# Patient Record
Sex: FEMALE | Race: OTHER | HISPANIC OR LATINO | Employment: UNEMPLOYED | URBAN - METROPOLITAN AREA
[De-identification: names, ages, dates, MRNs, and addresses within clinical notes are randomized per-mention and may not be internally consistent; named-entity substitution may affect disease eponyms.]

---
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
## 2024-01-30 ENCOUNTER — HOSPITAL ENCOUNTER (EMERGENCY)
Facility: HOSPITAL | Age: 19
Discharge: HOME | End: 2024-01-30
Attending: EMERGENCY MEDICINE
Payer: MEDICAID

## 2024-01-30 VITALS
HEART RATE: 94 BPM | DIASTOLIC BLOOD PRESSURE: 61 MMHG | HEIGHT: 59 IN | SYSTOLIC BLOOD PRESSURE: 96 MMHG | WEIGHT: 178.38 LBS | BODY MASS INDEX: 35.96 KG/M2 | TEMPERATURE: 99 F | RESPIRATION RATE: 16 BRPM | OXYGEN SATURATION: 99 %

## 2024-01-30 DIAGNOSIS — Z34.93 THIRD TRIMESTER PREGNANCY: Primary | ICD-10-CM

## 2024-01-30 PROCEDURE — 99282 EMERGENCY DEPT VISIT SF MDM: CPT

## 2024-01-30 NOTE — DISCHARGE INSTRUCTIONS
Report to Emergency Department if symptoms return or worsen; [facility] Within 1 to 2 days, It is important that you follow up with your primary care provider or specialist if indicated for further evaluation, workup, and treatment as necessary. The exam and treatment you received in Emergency Department was for an urgent problem and NOT INTENDED AS COMPLETE CARE. It is important that you FOLLOW UP with a doctor for ongoing care. If your symptoms become WORSE or you DO NOT IMPROVE and you are unable to reach your health care provider, you should RETURN to the Emergency Department. The Emergency Department provider has provided a PRELIMINARY INTERPRETATION of all your studies. A final interpretation may be done after you are discharged. If a change in your diagnosis or treatment is needed WE WILL CONTACT YOU. It is critical that we have a CURRENT PHONE NUMBER FOR YOU.

## 2024-02-02 ENCOUNTER — OFFICE VISIT (OUTPATIENT)
Dept: FAMILY MEDICINE | Facility: CLINIC | Age: 19
End: 2024-02-02
Payer: MEDICAID

## 2024-02-02 VITALS
HEART RATE: 58 BPM | SYSTOLIC BLOOD PRESSURE: 98 MMHG | HEIGHT: 59 IN | TEMPERATURE: 98 F | DIASTOLIC BLOOD PRESSURE: 57 MMHG | OXYGEN SATURATION: 99 % | RESPIRATION RATE: 20 BRPM | BODY MASS INDEX: 36.08 KG/M2 | WEIGHT: 179 LBS

## 2024-02-02 DIAGNOSIS — N34.2 INFECTIVE URETHRITIS: ICD-10-CM

## 2024-02-02 DIAGNOSIS — R30.0 DYSURIA: ICD-10-CM

## 2024-02-02 DIAGNOSIS — Z3A.36 36 WEEKS GESTATION OF PREGNANCY: Primary | ICD-10-CM

## 2024-02-02 LAB
APPEARANCE UR: ABNORMAL
BACTERIA #/AREA URNS AUTO: ABNORMAL /HPF
BILIRUB SERPL-MCNC: NORMAL MG/DL
BILIRUB UR QL STRIP.AUTO: NEGATIVE
BLOOD URINE, POC: NORMAL
C TRACH DNA SPEC QL NAA+PROBE: NOT DETECTED
CLARITY, POC UA: NORMAL
COLOR UR AUTO: ABNORMAL
COLOR, POC UA: YELLOW
GLUCOSE UR QL STRIP.AUTO: NORMAL
GLUCOSE UR QL STRIP: NORMAL
HYALINE CASTS #/AREA URNS LPF: ABNORMAL /LPF
KETONES UR QL STRIP.AUTO: NEGATIVE
KETONES UR QL STRIP: NORMAL
LEUKOCYTE ESTERASE UR QL STRIP.AUTO: 250
LEUKOCYTE ESTERASE URINE, POC: NORMAL
MUCOUS THREADS URNS QL MICRO: ABNORMAL /LPF
N GONORRHOEA DNA SPEC QL NAA+PROBE: NOT DETECTED
NITRITE UR QL STRIP.AUTO: NEGATIVE
NITRITE, POC UA: NORMAL
PH UR STRIP.AUTO: 7 [PH]
PH, POC UA: 7
PRENATAL STREP B CULTURE: NEGATIVE
PROT UR QL STRIP.AUTO: NEGATIVE
PROTEIN, POC: NORMAL
RBC #/AREA URNS AUTO: ABNORMAL /HPF
RBC UR QL AUTO: NEGATIVE
SOURCE (OHS): NORMAL
SP GR UR STRIP.AUTO: 1.01 (ref 1–1.03)
SPECIFIC GRAVITY, POC UA: 1.01
SQUAMOUS #/AREA URNS LPF: ABNORMAL /HPF
UROBILINOGEN UR STRIP-ACNC: NORMAL
UROBILINOGEN, POC UA: 0.2
WBC #/AREA URNS AUTO: ABNORMAL /HPF

## 2024-02-02 PROCEDURE — 87491 CHLMYD TRACH DNA AMP PROBE: CPT

## 2024-02-02 PROCEDURE — 81002 URINALYSIS NONAUTO W/O SCOPE: CPT | Mod: PBBFAC

## 2024-02-02 PROCEDURE — 81001 URINALYSIS AUTO W/SCOPE: CPT

## 2024-02-02 PROCEDURE — 87081 CULTURE SCREEN ONLY: CPT

## 2024-02-02 PROCEDURE — 99214 OFFICE O/P EST MOD 30 MIN: CPT | Mod: PBBFAC

## 2024-02-02 RX ORDER — NITROFURANTOIN 25; 75 MG/1; MG/1
100 CAPSULE ORAL 2 TIMES DAILY
Qty: 14 CAPSULE | Refills: 0 | Status: SHIPPED | OUTPATIENT
Start: 2024-02-02 | End: 2024-02-09

## 2024-02-02 NOTE — PROGRESS NOTES
[facility] OB OFFICE VISIT NOTE  Inocencia Tucker  [id number]  2024     Used: KG (Medical student) Pt stated she felt comfortable with medical student translating and preferred her over the  line.    Chief Complaint: 37 weeks 5 days gestation    Inocencia Tucker is a 18 y.o. female   at 37w5d (DESHAWN  2024) by 2nd trimester US  presenting to [facility] for routine OB visit.    Current Issues:   Pt had recent ED visit for concerns of abdominal palpitations. Palpitations occur in abdomen, feel superficial as if she can touch them, last for no longer than 20 min. No known aggravating or alleviating factors. She also states that she is still having vaginal irritation and burning at end of voids. Admits to recently shaving vaginal area. Denied any vaginal discharge bleeding or recent fevers. She has otherwise been feeling well. She denied any known contraction like pain.     Chronic Issues:   H/O Chlamydia during pregnancy - treated SAUNDRA 2023    Gestational History:  (date, GA, length labor, BW, sex, type, anesthesia, place, complications)  - G1: current    Review of Systems  Antepartum specific   - Fetal movements: yes  - Vaginal bleeding: no  - Vaginal discharge: no  - Loss of fluid: no  - Contractions: no  - Headaches: no  - Vision changes: no  - Edema: no    Physical Exam  Gen: in no acute distress  CVS: RRR, no r/g/m  Lungs: CTABL  Abd: NT, gravid, +BS; Gravid uterus palpable below umbilicus  Vaginal Exam: Vulva was normal in appearance without skin discoloration or rash noted. Inside labia major apperas to be erythematous, no active cuts, lesions or areas of fluctuance palpated. No discharge noted at introitus; speculum exam performed and cervix visualized. Thin white discharge present in vaginal vault. No current bleeding or discharge oozing from os.   Ext: no pitting edema; mild ankle swelling  FHT: 140s by doppler US  Fundal height: 36 cm  Cervix: posterior, 0cm dilated. Able to palpate  baby's head in cephalic like position     Current Medications:   Current Outpatient Medications   Medication Sig Dispense Refill    acetaminophen (TYLENOL) 500 MG tablet Take 2 tablets (1,000 mg total) by mouth every 8 (eight) hours as needed for Pain. 30 tablet 0    prenatal 21-iron fu-folic acid (PRENATAL COMPLETE) 14 mg iron- 400 mcg Tab Take 1 tablet by mouth once daily. 90 tablet 6     No current facility-administered medications for this visit.     Labs:  Recent Lab Results         02/02/24  1620   02/02/24  1545        Color, UA   Yellow       Clarity, UA   Slightly Cloudy       Bilirubin, POC   neg       Spec Grav UA   1.015       pH, UA   7.0       Protein, POC   neg       Urobilinogen, UA   0.2       Nitrite, UA   neg       Appearance, UA Hazy         Bacteria, UA Moderate         Bilirubin, UA Negative         Color, UA Light-Yellow         Glucose, UA   neg       Glucose, UA Normal         Hyaline Casts, UA 0-2         Ketones, UA   neg       Ketones, UA Negative         Leukocyte Esterase,          Mucous, UA Trace         NITRITE UA Negative         Blood, UA Negative         pH, UA 7.0         Protein, UA Negative         Blood, UA   neg       RBC, UA 0-5         Specific Gravity,UA 1.010         Squamous Epithelial Cells, UA Moderate         Urobilinogen, UA Normal         WBC, UA   trace       WBC, UA 6-10                 Initial OB Labs  - Blood Type and Rh: O+  - Antibody Screen: neg  - CBC H/H: 12.3/36.6  - HIV: NR  - RPR: NR  - GC: ND  - CT: detected-treated-SAUNDRA done 11/9/23 with pap smear  - HBsAg: NR  - HCVAb: NR  - Rubella: immunized  - Varicella: immunized  - UA & Culture: pending  - Sickle Cell Screen: neg  - PAP: NIL  - Influenza vaccine date: N/A  - BTL desired: not desired    15-20 Weeks Lab  - Quad Screen: Normal Risk    28 Week Lab(11/22/23)  - 1H GTT: 66  - Rhogam: not indicated  - Date of Tdap: 12/20/23  - CBC H/H: 12.3/36.9  - RPR: NR  - BTL consent: Does not desire at this  time    37 Week Lab Ordered 2/2/24  - CBC H/H:   - RPR:   - GBS Culture:   - HIV:   - Cervical GC:   - Cervical check: 0cm dilated, posterior position; cephalic presentation    Imaging:   Anatomy scan 11/9/23: A viable lowery pregnancy is visualized in cephalic presentation. Estimated fetal weight is at the 45th percentile with an abdominal circumference at the 55th percentile. No fetal abnormalities are noted and previous anatomic survey was normal. Amniotic fluid volume is normal. Placenta is posterior. A simple ovarian cyst is still present in the posterior cul de sac measuring 5.4 x 3.7 x 4cm (stable).      U/S 9/7/23: Single intrauterine fetus identified in cephalic  position.  Posterior low lying placenta.  Cystic lesion near the left adnexa measures 59 x 50 x 40 mm.  This measured 54 x 49 x 40 mm on prior ultrasound.    Initial U/S 6/30/23: Single live intrauterine pregnancy with ultrasound age by crown-rump length 6 weeks 6 days.       Assessment:   1. 36 weeks gestation of pregnancy    2. Infective urethritis    3. Dysuria      -OB Protocol   - H/O Chlamydia infection during pregnancy. Treated with Azithromycin twice; once outpatient and once in clinic supervised. SAUNDRA negative on 11/9/2023  -Wet prep and G/C swabs collected given persistent vaginal irritation. UA with reflex to culture ordered as well. Irritation can be related to regular shaving vs hormonal changes.  -U/A showed concern for UTI will send in AB course for pt   - PNVs  - Urine dip reviewed as above  - Routine labs reviewed; 37 week labs ordered and pending  - Mother plans to both breast and bottle feed  - Postpartum contraception discussion: Pending - considering nexplanon  - Labor precautions discussed in depth; she expressed understanding    Follow Up:  Return to clinic in 1 weeks for routine prenatal care    Hector Ly  [facility] HO-2

## 2024-02-05 ENCOUNTER — TELEPHONE (OUTPATIENT)
Dept: FAMILY MEDICINE | Facility: CLINIC | Age: 19
End: 2024-02-05
Payer: MEDICAID

## 2024-02-05 LAB — BACTERIA SPEC CULT: NORMAL

## 2024-02-05 NOTE — TELEPHONE ENCOUNTER
Pt notified of UTI and antibiotic  sent to pharmacy.    Hector Ly MD  [facility] -II

## 2024-02-09 ENCOUNTER — OFFICE VISIT (OUTPATIENT)
Dept: FAMILY MEDICINE | Facility: CLINIC | Age: 19
End: 2024-02-09
Payer: MEDICAID

## 2024-02-09 VITALS
HEIGHT: 59 IN | SYSTOLIC BLOOD PRESSURE: 103 MMHG | WEIGHT: 178.19 LBS | HEART RATE: 74 BPM | BODY MASS INDEX: 35.92 KG/M2 | OXYGEN SATURATION: 98 % | DIASTOLIC BLOOD PRESSURE: 66 MMHG | TEMPERATURE: 98 F

## 2024-02-09 DIAGNOSIS — Z3A.38 38 WEEKS GESTATION OF PREGNANCY: Primary | ICD-10-CM

## 2024-02-09 LAB
BASOPHILS # BLD AUTO: 0.03 X10(3)/MCL
BASOPHILS NFR BLD AUTO: 0.3 %
BILIRUB SERPL-MCNC: NEGATIVE MG/DL
BLOOD URINE, POC: NORMAL
CLARITY, POC UA: NORMAL
COLOR, POC UA: NORMAL
EOSINOPHIL # BLD AUTO: 0.95 X10(3)/MCL (ref 0–0.9)
EOSINOPHIL NFR BLD AUTO: 9.2 %
ERYTHROCYTE [DISTWIDTH] IN BLOOD BY AUTOMATED COUNT: 13.2 % (ref 11.5–17)
GLUCOSE UR QL STRIP: NEGATIVE
HCT VFR BLD AUTO: 38 % (ref 37–47)
HGB BLD-MCNC: 13.3 G/DL (ref 12–16)
HIV 1+2 AB+HIV1 P24 AG SERPL QL IA: NONREACTIVE
IMM GRANULOCYTES # BLD AUTO: 0.09 X10(3)/MCL (ref 0–0.04)
IMM GRANULOCYTES NFR BLD AUTO: 0.9 %
KETONES UR QL STRIP: NEGATIVE
LEUKOCYTE ESTERASE URINE, POC: NEGATIVE
LYMPHOCYTES # BLD AUTO: 3.06 X10(3)/MCL (ref 0.6–4.6)
LYMPHOCYTES NFR BLD AUTO: 29.7 %
MCH RBC QN AUTO: 31 PG (ref 27–31)
MCHC RBC AUTO-ENTMCNC: 35 G/DL (ref 33–36)
MCV RBC AUTO: 88.6 FL (ref 80–94)
MONOCYTES # BLD AUTO: 0.61 X10(3)/MCL (ref 0.1–1.3)
MONOCYTES NFR BLD AUTO: 5.9 %
NEUTROPHILS # BLD AUTO: 5.56 X10(3)/MCL (ref 2.1–9.2)
NEUTROPHILS NFR BLD AUTO: 54 %
NITRITE, POC UA: NEGATIVE
NRBC BLD AUTO-RTO: 0 %
PH, POC UA: 6
PLATELET # BLD AUTO: 199 X10(3)/MCL (ref 130–400)
PMV BLD AUTO: 12.4 FL (ref 7.4–10.4)
PROTEIN, POC: NEGATIVE
RBC # BLD AUTO: 4.29 X10(6)/MCL (ref 4.2–5.4)
SPECIFIC GRAVITY, POC UA: 1
T PALLIDUM AB SER QL: NONREACTIVE
UROBILINOGEN, POC UA: 0.2
WBC # SPEC AUTO: 10.3 X10(3)/MCL (ref 4.5–11.5)

## 2024-02-09 PROCEDURE — 36415 COLL VENOUS BLD VENIPUNCTURE: CPT | Performed by: STUDENT IN AN ORGANIZED HEALTH CARE EDUCATION/TRAINING PROGRAM

## 2024-02-09 PROCEDURE — 81002 URINALYSIS NONAUTO W/O SCOPE: CPT | Mod: PBBFAC | Performed by: STUDENT IN AN ORGANIZED HEALTH CARE EDUCATION/TRAINING PROGRAM

## 2024-02-09 PROCEDURE — 86780 TREPONEMA PALLIDUM: CPT | Performed by: STUDENT IN AN ORGANIZED HEALTH CARE EDUCATION/TRAINING PROGRAM

## 2024-02-09 PROCEDURE — 99213 OFFICE O/P EST LOW 20 MIN: CPT | Mod: PBBFAC | Performed by: STUDENT IN AN ORGANIZED HEALTH CARE EDUCATION/TRAINING PROGRAM

## 2024-02-09 PROCEDURE — 87389 HIV-1 AG W/HIV-1&-2 AB AG IA: CPT | Performed by: STUDENT IN AN ORGANIZED HEALTH CARE EDUCATION/TRAINING PROGRAM

## 2024-02-09 PROCEDURE — 85025 COMPLETE CBC W/AUTO DIFF WBC: CPT | Performed by: STUDENT IN AN ORGANIZED HEALTH CARE EDUCATION/TRAINING PROGRAM

## 2024-02-12 NOTE — PROGRESS NOTES
Faculty Attestation: Patient was seen in [facility]. Patient was seen and examined by the resident.  I have personally reviewed History of the Present Illness , Review of Systems, PFSH , Physical Exam, Assessment and Plan documented by the resident. I was immediately available throughout the encounter.Syphylis ab, , HIV , CBC done 2/9/24- no concerning abnormalities.Beta strep screen 2/2 Importance of adherence to treatment recommendations discussed with patient at the time of the visit. Services were furnished in a primary care center in the outpatient department at a [facility].L & D precautions reviewed with patient and she was directed to L & D at [facility] and not [facility] ED. I discussed the patient with the resident and agree with resident's findings and plan as documented in the resident note. Prabha Rosenberg MD

## 2024-02-13 PROCEDURE — 99999 HC NO LEVEL OF SERVICE - ED ONLY: CPT

## 2024-02-14 ENCOUNTER — HOSPITAL ENCOUNTER (INPATIENT)
Facility: HOSPITAL | Age: 19
LOS: 2 days | Discharge: HOME | End: 2024-02-16
Attending: OBSTETRICS & GYNECOLOGY | Admitting: OBSTETRICS & GYNECOLOGY
Payer: MEDICAID

## 2024-02-14 DIAGNOSIS — R10.13 EPIGASTRIC PAIN: Primary | ICD-10-CM

## 2024-02-14 DIAGNOSIS — Z34.90 ENCOUNTER FOR ELECTIVE INDUCTION OF LABOR: ICD-10-CM

## 2024-02-14 LAB
BASOPHILS # BLD AUTO: 0.04 X10(3)/MCL
BASOPHILS NFR BLD AUTO: 0.3 %
EOSINOPHIL # BLD AUTO: 0.74 X10(3)/MCL (ref 0–0.9)
EOSINOPHIL NFR BLD AUTO: 6.3 %
ERYTHROCYTE [DISTWIDTH] IN BLOOD BY AUTOMATED COUNT: 13.3 % (ref 11.5–17)
GROUP & RH: NORMAL
HCT VFR BLD AUTO: 41.2 % (ref 37–47)
HGB BLD-MCNC: 14.2 G/DL (ref 12–16)
IMM GRANULOCYTES # BLD AUTO: 0.09 X10(3)/MCL (ref 0–0.04)
IMM GRANULOCYTES NFR BLD AUTO: 0.8 %
INDIRECT COOMBS: NORMAL
LYMPHOCYTES # BLD AUTO: 3.7 X10(3)/MCL (ref 0.6–4.6)
LYMPHOCYTES NFR BLD AUTO: 31.7 %
MCH RBC QN AUTO: 30.7 PG (ref 27–31)
MCHC RBC AUTO-ENTMCNC: 34.5 G/DL (ref 33–36)
MCV RBC AUTO: 89 FL (ref 80–94)
MONOCYTES # BLD AUTO: 0.65 X10(3)/MCL (ref 0.1–1.3)
MONOCYTES NFR BLD AUTO: 5.6 %
NEUTROPHILS # BLD AUTO: 6.44 X10(3)/MCL (ref 2.1–9.2)
NEUTROPHILS NFR BLD AUTO: 55.3 %
NRBC BLD AUTO-RTO: 0 %
PLATELET # BLD AUTO: 182 X10(3)/MCL (ref 130–400)
PMV BLD AUTO: 12.6 FL (ref 7.4–10.4)
RBC # BLD AUTO: 4.63 X10(6)/MCL (ref 4.2–5.4)
SPECIMEN OUTDATE: NORMAL
T PALLIDUM AB SER QL: NONREACTIVE
WBC # SPEC AUTO: 11.66 X10(3)/MCL (ref 4.5–11.5)

## 2024-02-14 PROCEDURE — 11000001 HC ACUTE MED/SURG PRIVATE ROOM

## 2024-02-14 PROCEDURE — 10907ZC DRAINAGE OF AMNIOTIC FLUID, THERAPEUTIC FROM PRODUCTS OF CONCEPTION, VIA NATURAL OR ARTIFICIAL OPENING: ICD-10-PCS | Performed by: OBSTETRICS & GYNECOLOGY

## 2024-02-14 PROCEDURE — 63600175 PHARM REV CODE 636 W HCPCS

## 2024-02-14 PROCEDURE — 25000003 PHARM REV CODE 250

## 2024-02-14 PROCEDURE — 25000003 PHARM REV CODE 250: Performed by: OBSTETRICS & GYNECOLOGY

## 2024-02-14 PROCEDURE — 86780 TREPONEMA PALLIDUM: CPT | Performed by: OBSTETRICS & GYNECOLOGY

## 2024-02-14 PROCEDURE — 86901 BLOOD TYPING SEROLOGIC RH(D): CPT | Performed by: OBSTETRICS & GYNECOLOGY

## 2024-02-14 PROCEDURE — 63600175 PHARM REV CODE 636 W HCPCS: Performed by: OBSTETRICS & GYNECOLOGY

## 2024-02-14 PROCEDURE — 51702 INSERT TEMP BLADDER CATH: CPT

## 2024-02-14 PROCEDURE — 72100003 HC LABOR CARE, EA. ADDL. 8 HRS

## 2024-02-14 PROCEDURE — 94761 N-INVAS EAR/PLS OXIMETRY MLT: CPT

## 2024-02-14 PROCEDURE — 85025 COMPLETE CBC W/AUTO DIFF WBC: CPT | Performed by: OBSTETRICS & GYNECOLOGY

## 2024-02-14 PROCEDURE — 72100002 HC LABOR CARE, 1ST 8 HOURS

## 2024-02-14 PROCEDURE — 72200005 HC VAGINAL DELIVERY LEVEL II

## 2024-02-14 PROCEDURE — 3E0DXGC INTRODUCTION OF OTHER THERAPEUTIC SUBSTANCE INTO MOUTH AND PHARYNX, EXTERNAL APPROACH: ICD-10-PCS | Performed by: OBSTETRICS & GYNECOLOGY

## 2024-02-14 PROCEDURE — 0HQ9XZZ REPAIR PERINEUM SKIN, EXTERNAL APPROACH: ICD-10-PCS | Performed by: OBSTETRICS & GYNECOLOGY

## 2024-02-14 RX ORDER — DIPHENHYDRAMINE HYDROCHLORIDE 50 MG/ML
25 INJECTION INTRAMUSCULAR; INTRAVENOUS EVERY 4 HOURS PRN
Status: DISCONTINUED | OUTPATIENT
Start: 2024-02-14 | End: 2024-02-16 | Stop reason: HOSPADM

## 2024-02-14 RX ORDER — ONDANSETRON 4 MG/1
8 TABLET, ORALLY DISINTEGRATING ORAL EVERY 8 HOURS PRN
Status: DISCONTINUED | OUTPATIENT
Start: 2024-02-14 | End: 2024-02-16 | Stop reason: HOSPADM

## 2024-02-14 RX ORDER — TERBUTALINE SULFATE 1 MG/ML
0.25 INJECTION SUBCUTANEOUS
Status: DISCONTINUED | OUTPATIENT
Start: 2024-02-14 | End: 2024-02-14

## 2024-02-14 RX ORDER — LIDOCAINE HYDROCHLORIDE 10 MG/ML
10 INJECTION INFILTRATION; PERINEURAL ONCE AS NEEDED
Status: DISCONTINUED | OUTPATIENT
Start: 2024-02-14 | End: 2024-02-14

## 2024-02-14 RX ORDER — NALOXONE HCL 0.4 MG/ML
0.02 VIAL (ML) INJECTION
Status: DISCONTINUED | OUTPATIENT
Start: 2024-02-14 | End: 2024-02-14

## 2024-02-14 RX ORDER — SODIUM CITRATE AND CITRIC ACID MONOHYDRATE 334; 500 MG/5ML; MG/5ML
SOLUTION ORAL
Status: COMPLETED
Start: 2024-02-14 | End: 2024-02-14

## 2024-02-14 RX ORDER — OXYTOCIN 10 [USP'U]/ML
10 INJECTION, SOLUTION INTRAMUSCULAR; INTRAVENOUS ONCE AS NEEDED
Status: DISCONTINUED | OUTPATIENT
Start: 2024-02-14 | End: 2024-02-16 | Stop reason: HOSPADM

## 2024-02-14 RX ORDER — CARBOPROST TROMETHAMINE 250 UG/ML
250 INJECTION, SOLUTION INTRAMUSCULAR
Status: DISCONTINUED | OUTPATIENT
Start: 2024-02-14 | End: 2024-02-16 | Stop reason: HOSPADM

## 2024-02-14 RX ORDER — SODIUM CITRATE AND CITRIC ACID MONOHYDRATE 334; 500 MG/5ML; MG/5ML
30 SOLUTION ORAL ONCE
Status: DISCONTINUED | OUTPATIENT
Start: 2024-02-14 | End: 2024-02-14

## 2024-02-14 RX ORDER — METHYLERGONOVINE MALEATE 0.2 MG/ML
200 INJECTION INTRAVENOUS ONCE AS NEEDED
Status: DISCONTINUED | OUTPATIENT
Start: 2024-02-14 | End: 2024-02-16 | Stop reason: HOSPADM

## 2024-02-14 RX ORDER — OXYTOCIN/RINGER'S LACTATE 30/500 ML
95 PLASTIC BAG, INJECTION (ML) INTRAVENOUS ONCE AS NEEDED
Status: COMPLETED | OUTPATIENT
Start: 2024-02-14 | End: 2024-02-14

## 2024-02-14 RX ORDER — CALCIUM CARBONATE 200(500)MG
500 TABLET,CHEWABLE ORAL 3 TIMES DAILY PRN
Status: DISCONTINUED | OUTPATIENT
Start: 2024-02-14 | End: 2024-02-14

## 2024-02-14 RX ORDER — OXYTOCIN/RINGER'S LACTATE 30/500 ML
95 PLASTIC BAG, INJECTION (ML) INTRAVENOUS ONCE
Status: DISCONTINUED | OUTPATIENT
Start: 2024-02-14 | End: 2024-02-16 | Stop reason: HOSPADM

## 2024-02-14 RX ORDER — EPHEDRINE SULFATE 50 MG/ML
10 INJECTION, SOLUTION INTRAVENOUS
Status: DISCONTINUED | OUTPATIENT
Start: 2024-02-14 | End: 2024-02-14

## 2024-02-14 RX ORDER — DOCUSATE SODIUM 100 MG/1
200 CAPSULE, LIQUID FILLED ORAL 2 TIMES DAILY PRN
Status: DISCONTINUED | OUTPATIENT
Start: 2024-02-14 | End: 2024-02-16 | Stop reason: HOSPADM

## 2024-02-14 RX ORDER — PRENATAL WITH FERROUS FUM AND FOLIC ACID 3080; 920; 120; 400; 22; 1.84; 3; 20; 10; 1; 12; 200; 27; 25; 2 [IU]/1; [IU]/1; MG/1; [IU]/1; MG/1; MG/1; MG/1; MG/1; MG/1; MG/1; UG/1; MG/1; MG/1; MG/1; MG/1
1 TABLET ORAL DAILY
Status: DISCONTINUED | OUTPATIENT
Start: 2024-02-15 | End: 2024-02-16 | Stop reason: HOSPADM

## 2024-02-14 RX ORDER — IBUPROFEN 600 MG/1
600 TABLET ORAL EVERY 6 HOURS PRN
Status: DISCONTINUED | OUTPATIENT
Start: 2024-02-14 | End: 2024-02-16 | Stop reason: HOSPADM

## 2024-02-14 RX ORDER — DIPHENOXYLATE HYDROCHLORIDE AND ATROPINE SULFATE 2.5; .025 MG/1; MG/1
2 TABLET ORAL EVERY 6 HOURS PRN
Status: DISCONTINUED | OUTPATIENT
Start: 2024-02-14 | End: 2024-02-16 | Stop reason: HOSPADM

## 2024-02-14 RX ORDER — SIMETHICONE 80 MG
1 TABLET,CHEWABLE ORAL EVERY 6 HOURS PRN
Status: DISCONTINUED | OUTPATIENT
Start: 2024-02-14 | End: 2024-02-16 | Stop reason: HOSPADM

## 2024-02-14 RX ORDER — OXYTOCIN/RINGER'S LACTATE 30/500 ML
334 PLASTIC BAG, INJECTION (ML) INTRAVENOUS ONCE
Status: DISCONTINUED | OUTPATIENT
Start: 2024-02-14 | End: 2024-02-14

## 2024-02-14 RX ORDER — FENTANYL/BUPIVACAINE/NS/PF 2-1250MCG
PLASTIC BAG, INJECTION (ML) INJECTION CONTINUOUS
Status: DISCONTINUED | OUTPATIENT
Start: 2024-02-14 | End: 2024-02-14

## 2024-02-14 RX ORDER — HYDROCORTISONE 25 MG/G
CREAM TOPICAL 3 TIMES DAILY PRN
Status: DISCONTINUED | OUTPATIENT
Start: 2024-02-14 | End: 2024-02-16 | Stop reason: HOSPADM

## 2024-02-14 RX ORDER — ONDANSETRON 4 MG/1
8 TABLET, ORALLY DISINTEGRATING ORAL EVERY 8 HOURS PRN
Status: DISCONTINUED | OUTPATIENT
Start: 2024-02-14 | End: 2024-02-14

## 2024-02-14 RX ORDER — EPHEDRINE SULFATE 50 MG/ML
INJECTION, SOLUTION INTRAVENOUS
Status: DISCONTINUED
Start: 2024-02-14 | End: 2024-02-14 | Stop reason: WASHOUT

## 2024-02-14 RX ORDER — SODIUM CHLORIDE, SODIUM LACTATE, POTASSIUM CHLORIDE, CALCIUM CHLORIDE 600; 310; 30; 20 MG/100ML; MG/100ML; MG/100ML; MG/100ML
INJECTION, SOLUTION INTRAVENOUS CONTINUOUS
Status: DISCONTINUED | OUTPATIENT
Start: 2024-02-14 | End: 2024-02-14

## 2024-02-14 RX ORDER — MISOPROSTOL 100 UG/1
800 TABLET ORAL ONCE AS NEEDED
Status: DISCONTINUED | OUTPATIENT
Start: 2024-02-14 | End: 2024-02-16 | Stop reason: HOSPADM

## 2024-02-14 RX ORDER — ACETAMINOPHEN 325 MG/1
650 TABLET ORAL EVERY 6 HOURS SCHEDULED
Status: DISCONTINUED | OUTPATIENT
Start: 2024-02-14 | End: 2024-02-16 | Stop reason: HOSPADM

## 2024-02-14 RX ORDER — SIMETHICONE 80 MG
1 TABLET,CHEWABLE ORAL 4 TIMES DAILY PRN
Status: DISCONTINUED | OUTPATIENT
Start: 2024-02-14 | End: 2024-02-14

## 2024-02-14 RX ORDER — DIPHENHYDRAMINE HCL 25 MG
25 CAPSULE ORAL EVERY 4 HOURS PRN
Status: DISCONTINUED | OUTPATIENT
Start: 2024-02-14 | End: 2024-02-16 | Stop reason: HOSPADM

## 2024-02-14 RX ORDER — NALOXONE HCL 0.4 MG/ML
0.04 VIAL (ML) INJECTION EVERY 10 MIN PRN
Status: DISCONTINUED | OUTPATIENT
Start: 2024-02-14 | End: 2024-02-14

## 2024-02-14 RX ORDER — SODIUM CHLORIDE 0.9 % (FLUSH) 0.9 %
10 SYRINGE (ML) INJECTION
Status: DISCONTINUED | OUTPATIENT
Start: 2024-02-14 | End: 2024-02-16 | Stop reason: HOSPADM

## 2024-02-14 RX ORDER — ACETAMINOPHEN 325 MG/1
650 TABLET ORAL EVERY 6 HOURS PRN
Status: DISCONTINUED | OUTPATIENT
Start: 2024-02-14 | End: 2024-02-14

## 2024-02-14 RX ORDER — SODIUM CITRATE AND CITRIC ACID MONOHYDRATE 334; 500 MG/5ML; MG/5ML
30 SOLUTION ORAL ONCE
Status: COMPLETED | OUTPATIENT
Start: 2024-02-14 | End: 2024-02-14

## 2024-02-14 RX ORDER — ONDANSETRON HYDROCHLORIDE 2 MG/ML
4 INJECTION, SOLUTION INTRAVENOUS EVERY 6 HOURS PRN
Status: DISCONTINUED | OUTPATIENT
Start: 2024-02-14 | End: 2024-02-14

## 2024-02-14 RX ORDER — OXYTOCIN/RINGER'S LACTATE 30/500 ML
95 PLASTIC BAG, INJECTION (ML) INTRAVENOUS ONCE
Status: DISCONTINUED | OUTPATIENT
Start: 2024-02-14 | End: 2024-02-14

## 2024-02-14 RX ORDER — MISOPROSTOL 100 MCG
25 TABLET ORAL EVERY 4 HOURS PRN
Status: DISCONTINUED | OUTPATIENT
Start: 2024-02-14 | End: 2024-02-14

## 2024-02-14 RX ORDER — OXYCODONE AND ACETAMINOPHEN 5; 325 MG/1; MG/1
1 TABLET ORAL EVERY 4 HOURS PRN
Status: DISCONTINUED | OUTPATIENT
Start: 2024-02-14 | End: 2024-02-16 | Stop reason: HOSPADM

## 2024-02-14 RX ORDER — OXYTOCIN/RINGER'S LACTATE 30/500 ML
334 PLASTIC BAG, INJECTION (ML) INTRAVENOUS ONCE AS NEEDED
Status: DISCONTINUED | OUTPATIENT
Start: 2024-02-14 | End: 2024-02-16 | Stop reason: HOSPADM

## 2024-02-14 RX ADMIN — Medication: at 06:02

## 2024-02-14 RX ADMIN — SODIUM CITRATE AND CITRIC ACID MONOHYDRATE 30 ML: 500; 334 SOLUTION ORAL at 10:02

## 2024-02-14 RX ADMIN — SODIUM CHLORIDE, POTASSIUM CHLORIDE, SODIUM LACTATE AND CALCIUM CHLORIDE: 600; 310; 30; 20 INJECTION, SOLUTION INTRAVENOUS at 03:02

## 2024-02-14 RX ADMIN — IBUPROFEN 600 MG: 600 TABLET, FILM COATED ORAL at 09:02

## 2024-02-14 RX ADMIN — ACETAMINOPHEN 650 MG: 325 TABLET, FILM COATED ORAL at 06:02

## 2024-02-14 RX ADMIN — SODIUM CITRATE AND CITRIC ACID MONOHYDRATE 30 ML: 334; 500 SOLUTION ORAL at 10:02

## 2024-02-14 RX ADMIN — MISOPROSTOL 25 MCG: 100 TABLET ORAL at 04:02

## 2024-02-14 RX ADMIN — SODIUM CHLORIDE, POTASSIUM CHLORIDE, SODIUM LACTATE AND CALCIUM CHLORIDE 1000 ML: 600; 310; 30; 20 INJECTION, SOLUTION INTRAVENOUS at 10:02

## 2024-02-14 RX ADMIN — Medication 95 MILLI-UNITS/MIN: at 06:02

## 2024-02-14 RX ADMIN — SODIUM CHLORIDE, POTASSIUM CHLORIDE, SODIUM LACTATE AND CALCIUM CHLORIDE: 600; 310; 30; 20 INJECTION, SOLUTION INTRAVENOUS at 02:02

## 2024-02-14 NOTE — ED PROVIDER NOTES
Encounter Date: 2024       History     Chief Complaint   Patient presents with    Abdominal Pain     8-9 mo pregnant, abdominal pain, pt sent to labor and delivery via wheelchair    39.3 weeks G1 c/o ctx since 0900 but now so frequestly pt c     HPI  Review of patient's allergies indicates:  No Known Allergies  Past Medical History:   Diagnosis Date    Anemia     Mental disorder      History reviewed. No pertinent surgical history.  Family History   Problem Relation Age of Onset    Cancer Mother      Social History     Tobacco Use    Smoking status: Never     Passive exposure: Never    Smokeless tobacco: Never   Substance Use Topics    Alcohol use: Never     Review of Systems    Physical Exam     Initial Vitals   BP Pulse Resp Temp SpO2   24 0035 24 0035 24 0035 24 0035 24 0059   112/71 66 18 98.1 °F (36.7 °C) 98 %      MAP       --                Physical Exam    ED Course   Procedures  Labs Reviewed - No data to display       Imaging Results    None          Medications - No data to display  Medical Decision Making                                    Clinical Impression:   This  @ 39 3/7 weeks gestation presents with complaints of pain and contractions: She reports +FM, no LOF, no UB, +CTX  Denies any complications this pregnancy    Tracing: reactive  VE: C/L/P    A/P: Labor ruled out  -no cervical change made  -pain, bleeding, movement precautions given  -follow up at next scheduled prenatal visit  -due to her gestational age, the patient has elective for induction. Risk and benefits explained     ED Disposition Condition    Discharge           ED Prescriptions    None       Follow-up Information       Follow up With Specialties Details Why Contact [facility], [facility]  Follow up attend next scheduled appt [address] [facility] [id number] [phone]               Krystian Maddox MD  24 0130       Krystian Maddox MD  24 0146

## 2024-02-14 NOTE — L&D DELIVERY NOTE
[facility] - Labor and Delivery  Vaginal Delivery   Operative Note    SUMMARY   Inocencia Tucker progressed to C/C/+2 station and delivered vaginally under epidural anesthesia. With good maternal effort head was delivered in the position over an intact perineum. Head restituted and delivered spontaneously; nuchal cord x1 noted and reduced at the perineum. Gentle downward traction was used to deliver the anterior shoulder followed by gentle upward traction to deliver the posterior shoulder and the remainder of the body. Infant was suctioned on the abdomen of mom after delivery. Cord was clamped x2 and then cut. Cord blood obtained. Placenta was delivered intact with a normal 3-vessel cord. The fundus was firm following initiation of IV Pitocin. There was a 1st degree midline perineal laceration noted upon thorough inspection and repaired with 3-0 Vicryl SH, noted to be hemostatic after repair. Viable male infant with Apgars 8  at 1 minute and 9  at 5 minutes. QBL 50 mL. Dr. Nathan. Needle, sponge, and Raytec count correct. Infant and patient in good and stable condition.     Admitting GA: 39w3d    Delivery Information for Jorge Tucker    Birth information:  YOB: 2025   Time of birth: 3:41 PM   Sex: male   Head Delivery Date/Time: 2024  3:40 PM   Delivery type: Vaginal, Spontaneous   Gestational Age: 39w3d        Delivery Providers    Delivering clinician: Remberto Nathan MD   Provider Role    Leticia Valdez RN Registered Nurse    Tasha Mg RN Registered Nurse    Tammi Foreman RN Registered Nurse              Measurements    Weight:   Length:          Apgars    Living status: Living  Apgar Component Scores:  1 min.:  5 min.:  10 min.:  15 min.:  20 min.:    Skin color:  0  1       Heart rate:  2  2       Reflex irritability:  2  2       Muscle tone:  2  2       Respiratory effort:  2  2       Total:  8  9       Apgars assigned by: COREY FLEMING         Operative Delivery     Forceps attempted?: No  Vacuum extractor attempted?: No         Shoulder Dystocia    Shoulder dystocia present?: No           Presentation    Presentation: Vertex  Position: Left Occiput Anterior           Interventions/Resuscitation    Method: Bulb Suctioning, Tactile Stimulation       Cord    Vessels: 3 vessels  Complications: Nuchal  Nuchal Intervention: reduced  Nuchal Cord Description: loose nuchal cord  Number of Loops: 1  Delayed Cord Clamping?: Yes  Cord Clamped Date/Time: 2024  2:42 PM  Cord Blood Disposition: Sent with Baby  Gases Sent?: No  Stem Cell Collection (by MD): No       Placenta    Placenta delivery date/time: 2024 1546  Placenta removal: Spontaneous  Placenta appearance: Intact  Placenta disposition: Discarded           Labor Events:       labor: No     Labor Onset Date/Time: 2024 04:40     Dilation Complete Date/Time: 2024 14:40     Start Pushing Date/Time: 2024 15:15     Rupture Date/Time: 24  1130         Rupture type: ARM (Artificial Rupture)         Fluid Amount:       Fluid Color: Clear       steroids: None     Antibiotics given for GBS: No     Induction: misoprostol     Indications for induction:  Elective     Augmentation:       Indications for augmentation:       Labor complications: None     Additional complications:          Cervical ripening:                     Delivery:      Episiotomy: None     Indication for Episiotomy:       Perineal Lacerations: 1st Repaired:  Yes   Periurethral Laceration:   Repaired:     Labial Laceration: right Repaired: Yes   Sulcus Laceration:   Repaired:     Vaginal Laceration:   Repaired:     Cervical Laceration:   Repaired:     Repair suture:       Repair # of packets: 1     Last Value - EBL - Nursing (mL):       Sum - EBL - Nursing (mL): 0     Last Value - EBL - Anesthesia (mL):      Calculated QBL (mL): 50      Running total QBL (mL): 50      Vaginal Sweep Performed: Yes     Surgicount Correct: Yes        Other providers:       Anesthesia    Method: Epidural          Details (if applicable):  Trial of Labor      Categorization:      Priority:     Indications for :     Incision Type:       Additional  information:  Forceps:    Vacuum:    Breech:    Observed anomalies    Other (Comments):         Shanelle Graf MD  [employer] [facility] PGY-2  2024 4:51 PM

## 2024-02-14 NOTE — H&P
This  @ 39 3/7 weeks gestation presents with complaints of pain and contractions: She reports +FM, no LOF, no UB, +CTX  Denies any complications this pregnancy     Tracing: reactive  VE: C/L/P     A/P: Labor ruled out  -no cervical change made  -pain, bleeding, movement precautions given  -follow up at next scheduled prenatal visit  -due to her gestational age, the patient has elective for induction. Risk and benefits explained  -Admit, for vaginal cytotec induction 25mcg Q3hrs up to 6 dose or 3cm which ever comes first  -NPO after 3cm

## 2024-02-14 NOTE — Clinical Note
Discharge instructions given to patient and reviewed. Patient given time to ask questions and questions answered. Patient educated on when to return to hospital and importance of prenatal visits with primary OB. Patient verbally understands and agree s to discharge. Patient ambulated off unit to private vehicle.

## 2024-02-15 PROBLEM — Z3A.39 39 WEEKS GESTATION OF PREGNANCY: Status: ACTIVE | Noted: 2024-02-15

## 2024-02-15 PROBLEM — Z34.90 ENCOUNTER FOR ELECTIVE INDUCTION OF LABOR: Status: ACTIVE | Noted: 2024-02-15

## 2024-02-15 PROCEDURE — 90471 IMMUNIZATION ADMIN: CPT | Performed by: OBSTETRICS & GYNECOLOGY

## 2024-02-15 PROCEDURE — 3E02340 INTRODUCTION OF INFLUENZA VACCINE INTO MUSCLE, PERCUTANEOUS APPROACH: ICD-10-PCS | Performed by: OBSTETRICS & GYNECOLOGY

## 2024-02-15 PROCEDURE — 25000003 PHARM REV CODE 250

## 2024-02-15 PROCEDURE — 90686 IIV4 VACC NO PRSV 0.5 ML IM: CPT | Performed by: OBSTETRICS & GYNECOLOGY

## 2024-02-15 PROCEDURE — 63600175 PHARM REV CODE 636 W HCPCS: Performed by: OBSTETRICS & GYNECOLOGY

## 2024-02-15 PROCEDURE — 25000003 PHARM REV CODE 250: Performed by: OBSTETRICS & GYNECOLOGY

## 2024-02-15 PROCEDURE — 11000001 HC ACUTE MED/SURG PRIVATE ROOM

## 2024-02-15 RX ADMIN — DOCUSATE SODIUM 200 MG: 100 CAPSULE, LIQUID FILLED ORAL at 10:02

## 2024-02-15 RX ADMIN — IBUPROFEN 600 MG: 600 TABLET, FILM COATED ORAL at 10:02

## 2024-02-15 RX ADMIN — IBUPROFEN 600 MG: 600 TABLET, FILM COATED ORAL at 04:02

## 2024-02-15 RX ADMIN — INFLUENZA VIRUS VACCINE 0.5 ML: 15; 15; 15; 15 SUSPENSION INTRAMUSCULAR at 04:02

## 2024-02-15 RX ADMIN — PRENATAL VITAMINS-IRON FUMARATE 27 MG IRON-FOLIC ACID 0.8 MG TABLET 1 TABLET: at 10:02

## 2024-02-15 NOTE — MEDICAL/APP STUDENT
Post Vaginal Delivery Progress Note:     Holly Tucker is a 18 y.o.  s/p , PPD #1. Patient resting comfortably in bed. Lochia similar to her menses. Tolerating regular diet without nausea/vomiting. Patient reports mild abd pain well-controlled with ibuprofen and acetaminophen. Patient denies any issues or complaints. Ambulating, voiding spontaneously, and tolerating regular diet without N/V. Passing flatus, not yet had a BM. No subjective fever or chills. No HA, vision changes, dizziness, CP, SOB, breast pain, RUQ pain, or lower extremity pain. Currently bottle feeding. Infant at bedside. Nurse reports no acute events overnight.      Objective:     Vitals:    24 1821 24 2014 02/15/24 0020 02/15/24 0754   BP: 127/81 114/67 112/70 101/63   Pulse: 83 71 99 99   Resp:    18   Temp: 98.3 °F (36.8 °C) 98.2 °F (36.8 °C) 98.2 °F (36.8 °C) 98.1 °F (36.7 °C)   TempSrc: Oral Oral Oral Oral   SpO2:       Weight:       Height:           General:  Alert and oriented, in no acute distress   Lungs:  Clear to auscultation bilaterally   Heart::  Regular rate and rhythm   Abdomen:   Soft, nondistended, normoactive bowel sounds   Pelvic:  Scant blood present on pad   Uterine Fundus:   Firm, below the umbilicus   Extremities:  No cords, erythema, warmth, or tenderness to palpation in bilateral lower extremities; no significant calf/ankle edema     Labs  No results found for this or any previous visit (from the past 24 hour(s)).    Trended Lab Data:  Recent Labs   Lab 24  1600 24  0247   WBC 10.30 11.66*   HGB 13.3 14.2    182   MCV 88.6 89.0       Medications   acetaminophen  650 mg Oral 4 times per day    oxytocin in lactated ringers  95 amanda-units/min Intravenous Once    prenatal vitamin  1 tablet Oral Daily      benzocaine-lanolin         Assessment/Plan  18 y.o.  s/p , PPD #1. Clinically stable and doing well. Pregnancy/postpartum course complicated by:     Continue  routine postpartum care.  Feeding: Patient plans to continue bottle feeding.  Pain: scheduled ibuprofen and tylenol.  PPBCM: undecided  Dispo: continue to monitor, anticipate discharge to home on postpartum day #2 if meeting all goals.      Will discuss patient and plan of care with Dr. Nathan.    Joseline Vazquez, MS3  \[facility]\"" Medical Student  OB/GYN

## 2024-02-15 NOTE — PROGRESS NOTES
"Qing and URBAN met with Inocencia in her room.  was used.  Inocencia and baby boy were in the bed and appeared tired. Qing and URBAN introduced ourselves and explained our role. We assessed barriers and any concerns Naomie had. Barriers to care included patient's need of a pediatrician, a safe place for baby boy to sleep, possible transportation assistance. We inquired if patient received WIC and SNAP and she stated that she has WIC. Antwan provided education of  SNAP benefits and stated that WIC could assist her with the application. We inquired about baby boy's name and she stated "Chriss Grayson Jr. " We inquired about patient's OBGYN during pregnancy and she stated she was unsure of the name. Patient's address and phone number was also verified. We inquired if she would breast feed or formula feed and she stated she will do both. She did say that she has a car seat and will be getting someone to bring it before baby boy is discharged. Education for safe sleep was provided. Patient declined any history of anxiety, depression, or use of substances. She also declined use of medication during pregnancy. We asked Inocencia if there were any further questions or concerns and she declined.     Lou Finnegan, MSW Intern    Provided education to Mom regarding safe sleep and Healthy Start program through the [facility]. Mom was agreeable to referral. RN spoke with Mom regarding the need for her to demonstrate safe sleep practices in the hospital in order for the [facility] to provide a pack and play to Mom. Spoke with RN who reported safe sleep practices demonstrated after this conversation and will follow up with RN tomorrow to ensure safe sleep practices have continued overnight. [facility] referral made 2/15/2024. I, Qing Del Real, was present throughout the entirety of this consult and assisted MSW Intern as needed.   "

## 2024-02-15 NOTE — PROGRESS NOTES
Postpartum Progress Note    Inocencia Tucker is a 18 y.o.  s/p  currently Post-Partum day 1.      Nurse reports no acute events overnight. Patient reports mild abd pain. Patient denies any issues or complaints. Ambulating, voiding, and tolerating regular diet. Passing flatus, no BM. Lochia is decreasing. No subjective fever or chills. Pain well controlled with ibuprofen.  No HA, vision changes, dizziness, N/V, CP, SOB, breast pain or lower extremity pain.  Currently bottle feeding. Baby in room.      : Mabel [id number]    Physical Exam:   Vitals:    24 1821 02/14/24 2014 02/15/24 0020 02/15/24 0754   BP: 127/81 114/67 112/70 101/63   Pulse: 83 71 99 99   Resp:    18   Temp: 98.3 °F (36.8 °C) 98.2 °F (36.8 °C) 98.2 °F (36.8 °C) 98.1 °F (36.7 °C)   TempSrc: Oral Oral Oral Oral   SpO2:       Weight:       Height:           Gen: AAO x 3, NAD, resting in bed comfortably   CV: RRR, S1, S2, no murmurs  Resp: Non labored, lungs CTA bilaterally, good air movement  Abd: fundus firm palpable below the umbilicus, abdomen soft and NT, + BS  Ext: no edema, calves non tender and equal in size, DP/Radial 2+   Psych: cooperative, normal affect    Assessment/Plan  18 y.o. female   Status Post Vaginal delivery PPD/POD#1.     Patient Active Problem List   Diagnosis    Spontaneous vaginal delivery    Encounter for elective induction of labor    39 weeks gestation of pregnancy         Continue routine post-partum/operative care, continue to monitor  Patient does plan to Bottle feed  Continue PNV  H/H stable and appropriate  Up ad aime; Pelvic Rest for 6 weeks.  DVT PPx - Ambulation   Dispo: Likely stable for discharge home on Post-op day #2      Dami Christensen MD  [facility]

## 2024-02-16 VITALS
WEIGHT: 180 LBS | HEART RATE: 78 BPM | HEIGHT: 59 IN | TEMPERATURE: 98 F | RESPIRATION RATE: 18 BRPM | DIASTOLIC BLOOD PRESSURE: 53 MMHG | SYSTOLIC BLOOD PRESSURE: 91 MMHG | OXYGEN SATURATION: 100 % | BODY MASS INDEX: 36.29 KG/M2

## 2024-02-16 PROCEDURE — 25000003 PHARM REV CODE 250: Performed by: OBSTETRICS & GYNECOLOGY

## 2024-02-16 PROCEDURE — 25000003 PHARM REV CODE 250

## 2024-02-16 RX ORDER — ACETAMINOPHEN 325 MG/1
650 TABLET ORAL EVERY 6 HOURS PRN
Qty: 30 TABLET | Refills: 0 | Status: SHIPPED | OUTPATIENT
Start: 2024-02-16

## 2024-02-16 RX ORDER — IBUPROFEN 600 MG/1
600 TABLET ORAL EVERY 6 HOURS PRN
Qty: 30 TABLET | Refills: 0 | Status: SHIPPED | OUTPATIENT
Start: 2024-02-16

## 2024-02-16 RX ADMIN — IBUPROFEN 600 MG: 600 TABLET, FILM COATED ORAL at 09:02

## 2024-02-16 RX ADMIN — PRENATAL VITAMINS-IRON FUMARATE 27 MG IRON-FOLIC ACID 0.8 MG TABLET 1 TABLET: at 09:02

## 2024-02-16 RX ADMIN — IBUPROFEN 600 MG: 600 TABLET, FILM COATED ORAL at 04:02

## 2024-02-16 NOTE — DISCHARGE SUMMARY
Delivery Discharge Summary  Obstetrics      Primary OB Clinician: [facility]     Admission date: 2024  Discharge date: 2024    Disposition: To home, self care    Discharge Diagnosis List:      Patient Active Problem List   Diagnosis    Spontaneous vaginal delivery    Encounter for elective induction of labor    39 weeks gestation of pregnancy       Procedure:     Hospital Course:  Inocencia Tucker is a 18 y.o. now , PPD #2 who was admitted on 2024 . Patient was subsequently admitted to labor and delivery unit with signed consents.     Labor course was uncomplicated and resulted in  without complications.     Please see delivery note for further details. Her postpartum course was uncomplicated. On discharge day, patient's pain is controlled with oral pain medications. Pt is tolerating ambulation without SOB or CP, and regular diet without N/V. Reports lochia is mild. Denies any HA, vision changes, F/C, LE swelling. Denies any breast pain/soreness.    Pt in stable condition and ready for discharge. She has been instructed to start and/or continue medications and follow up with her obstetrics provider as listed below.    Pertinent studies:  CBC  Recent Labs   Lab 24  1600 24  0247   WBC 10.30 11.66*   HGB 13.3 14.2   HCT 38.0 41.2   MCV 88.6 89.0    182        Exam:  General: in no acute distress  RESP: clear to auscultation bilaterally, non labored  CV: regular rate and rhythm, no murmurs,  ABD:Soft, nontender, BS+, Fundus firm at umbilicus; Incision clean, dry, intact  Bilateral lower extremity no edema or tenderness      Immunization History   Administered Date(s) Administered    Influenza - Quadrivalent - PF *Preferred* (6 months and older) 02/15/2024    Tdap 2023        Delivery:    Episiotomy: None   Lacerations: 1st   Repair suture:     Repair # of packets: 1   Blood loss (ml):       Birth information:  YOB: 2025   Time of birth: 3:41 PM   Sex:  "male   Delivery type: Vaginal, Spontaneous   Gestational Age: 39w3d     Measurements    Weight: 3062 g  Weight (lbs): 6 lb 12 oz  Length: 48.3 cm  Length (in): 19"  Head circumference: 30.5 cm         Delivery Clinician: Delivery Providers    Delivering clinician: Remberto Nathan MD   Provider Role    Leticia Valdez RN Registered Nurse    Tasha Mg RN Registered Nurse    Tammi Foreman RN Registered Nurse             Additional  information:  Forceps:    Vacuum:    Breech:    Observed anomalies      Living?:     Apgars    Living status: Living  Apgar Component Scores:  1 min.:  5 min.:  10 min.:  15 min.:  20 min.:    Skin color:  0  1       Heart rate:  2  2       Reflex irritability:  2  2       Muscle tone:  2  2       Respiratory effort:  2  2       Total:  8  9       Apgars assigned by: COREY FLEMING         Placenta: Delivered:       appearance    Patient Instructions:   Current Discharge Medication List        START taking these medications    Details   ibuprofen (ADVIL,MOTRIN) 600 MG tablet Take 1 tablet (600 mg total) by mouth every 6 (six) hours as needed for Pain.  Qty: 30 tablet, Refills: 0           CONTINUE these medications which have CHANGED    Details   acetaminophen (TYLENOL) 325 MG tablet Take 2 tablets (650 mg total) by mouth every 6 (six) hours as needed for Pain.  Qty: 30 tablet, Refills: 0           CONTINUE these medications which have NOT CHANGED    Details   prenatal 21-iron fu-folic acid (PRENATAL COMPLETE) 14 mg iron- 400 mcg Tab Take 1 tablet by mouth once daily.  Qty: 90 tablet, Refills: 6    Associated Diagnoses: 19 weeks gestation of pregnancy             No discharge procedures on file.     Follow-up Information       Clinics, [facility] Follow up.    Why: Go to appointment  at 1:20pm  Contact information:  [address] [facility] [address] [phone]                              Follow-up will be at [facility] family [facility] clinic.  Follow-up will be as above.  ER " precautions were reviewed with the patient and she was given opportunity to ask questions.  Stable for discharge.      Dami Christensen MD  [facility]

## 2024-02-20 NOTE — PROGRESS NOTES
Faculty addendum: Patient discussed with resident. Chart was reviewed including vitals, labs, etc. Care provided reasonable and necessary. I participated in the management of the patient and was immediately available throughout the encounter. Services were furnished in a primary care center located in the outpatient department of a [facility] hospital. I agree with the resident's findings and plan as documented in the resident's note.

## 2024-04-04 ENCOUNTER — OFFICE VISIT (OUTPATIENT)
Dept: FAMILY MEDICINE | Facility: CLINIC | Age: 19
End: 2024-04-04

## 2024-04-04 VITALS
HEIGHT: 59 IN | BODY MASS INDEX: 30.92 KG/M2 | OXYGEN SATURATION: 98 % | WEIGHT: 153.38 LBS | SYSTOLIC BLOOD PRESSURE: 100 MMHG | TEMPERATURE: 98 F | DIASTOLIC BLOOD PRESSURE: 69 MMHG | HEART RATE: 80 BPM

## 2024-04-04 DIAGNOSIS — Z00.00 PREVENTATIVE HEALTH CARE: Primary | ICD-10-CM

## 2024-04-04 DIAGNOSIS — Z3A.39 39 WEEKS GESTATION OF PREGNANCY: ICD-10-CM

## 2024-04-04 PROCEDURE — 99213 OFFICE O/P EST LOW 20 MIN: CPT | Mod: PBBFAC

## 2024-04-11 ENCOUNTER — OFFICE VISIT (OUTPATIENT)
Dept: FAMILY MEDICINE | Facility: CLINIC | Age: 19
End: 2024-04-11

## 2024-04-11 VITALS
OXYGEN SATURATION: 98 % | HEIGHT: 59 IN | BODY MASS INDEX: 29.03 KG/M2 | WEIGHT: 144 LBS | RESPIRATION RATE: 18 BRPM | HEART RATE: 92 BPM | TEMPERATURE: 98 F | DIASTOLIC BLOOD PRESSURE: 64 MMHG | SYSTOLIC BLOOD PRESSURE: 99 MMHG

## 2024-04-11 DIAGNOSIS — N89.8 VAGINAL IRRITATION: Primary | ICD-10-CM

## 2024-04-11 DIAGNOSIS — Z30.013 ENCOUNTER FOR INITIAL PRESCRIPTION OF INJECTABLE CONTRACEPTIVE: ICD-10-CM

## 2024-04-11 LAB
APPEARANCE UR: CLEAR
B-HCG UR QL: NEGATIVE
BACTERIA #/AREA URNS AUTO: ABNORMAL /HPF
BILIRUB UR QL STRIP.AUTO: NEGATIVE
C TRACH DNA SPEC QL NAA+PROBE: NOT DETECTED
CLUE CELLS VAG QL WET PREP: NORMAL
COLOR UR AUTO: ABNORMAL
CTP QC/QA: YES
GLUCOSE UR QL STRIP.AUTO: NORMAL
HYALINE CASTS #/AREA URNS LPF: ABNORMAL /LPF
KETONES UR QL STRIP.AUTO: NEGATIVE
LEUKOCYTE ESTERASE UR QL STRIP.AUTO: NEGATIVE
N GONORRHOEA DNA SPEC QL NAA+PROBE: NOT DETECTED
NITRITE UR QL STRIP.AUTO: NEGATIVE
PH UR STRIP.AUTO: 5.5 [PH]
PROT UR QL STRIP.AUTO: NEGATIVE
RBC #/AREA URNS AUTO: ABNORMAL /HPF
RBC UR QL AUTO: NEGATIVE
SOURCE (OHS): NORMAL
SP GR UR STRIP.AUTO: 1.02 (ref 1–1.03)
SQUAMOUS #/AREA URNS LPF: ABNORMAL /HPF
T VAGINALIS VAG QL WET PREP: NORMAL
UROBILINOGEN UR STRIP-ACNC: NORMAL
WBC #/AREA URNS AUTO: ABNORMAL /HPF
WBC #/AREA VAG WET PREP: NORMAL
YEAST SPEC QL WET PREP: NORMAL

## 2024-04-11 PROCEDURE — 87591 N.GONORRHOEAE DNA AMP PROB: CPT

## 2024-04-11 PROCEDURE — 96372 THER/PROPH/DIAG INJ SC/IM: CPT | Mod: PBBFAC

## 2024-04-11 PROCEDURE — 87086 URINE CULTURE/COLONY COUNT: CPT

## 2024-04-11 PROCEDURE — 81025 URINE PREGNANCY TEST: CPT | Mod: PBBFAC

## 2024-04-11 PROCEDURE — 99214 OFFICE O/P EST MOD 30 MIN: CPT | Mod: PBBFAC,25

## 2024-04-11 PROCEDURE — 87210 SMEAR WET MOUNT SALINE/INK: CPT

## 2024-04-11 PROCEDURE — 81001 URINALYSIS AUTO W/SCOPE: CPT

## 2024-04-11 RX ORDER — MEDROXYPROGESTERONE ACETATE 150 MG/ML
150 INJECTION, SUSPENSION INTRAMUSCULAR
Status: COMPLETED | OUTPATIENT
Start: 2024-04-11 | End: 2024-04-11

## 2024-04-11 RX ADMIN — MEDROXYPROGESTERONE ACETATE 150 MG: 150 INJECTION, SUSPENSION INTRAMUSCULAR at 03:04

## 2024-04-11 NOTE — PROGRESS NOTES
Postpartum OB Clinic    Chief Complaint  Chief Complaint   Patient presents with    Postpartum Care     Pt is c/o vaginal irritation, onset one week ago. Pt is also interested in contraceptives      History of Present Illness  Inocencia Tucker is a 18 y.o.  S/P  Post-Partum here at clinic for 8wk f/u visit.    Pt is s/p  at 39w3d gestation on 2024. Apgars 8/9. No issues during the pregnancy or delivery.. Patient is doing well today; has support at home and is currently Breast and Bottle feeding.   She denies depressed mood or suicidal/homicidal ideations. States infant is Home, doing well. Denies HA, light-headedness/dizziness, visual changes, CP, LE edema, SOB, abdominal pain, n/v, dec appetite. Denies resumption of sexual activity. Reports increasing stress affecting mood because of financial hardship.    Reports vaginal irration at end of urination. Patient states only mildly bothersome.  Requests STD testing today. Denies dysuria, fever, vaginal discharge.     Review the Delivery Report for details.     ROS:  - see HPI    Physical Exam:  Temp:  [98.3 °F (36.8 °C)]   Pulse:  [92]   Resp:  [18]   BP: (99)/(64)   SpO2:  [98 %]   General:  VSS, afebrile. No acute distress.   Respiratory: Non labored.  No coughing.  Cardiovascular:  No peripheral edema.  DP pulses palpable bilaterally.   ABD: BS+, Soft, nontender  Musculoskeletal:  Full range of motion of all extremities; no joint deformities or edema.   Neurologic:  A&O X 3.    Psychiatric:  Calm, cooperative.  Mood and effect normal.  Responses appropriate.   : chaperone present. No vulvar irritation, erythema, or skin changes. No vaginal or cervical discharge. No vaginal or cervical irritation or erythema. Pain with speculum insertion. Vaginal wall tenderness. CMT present without friability.    [place]  Depression Scale:   [place]  Depression Scale:  In the Past 7 Days  I have been able to laugh and see the funny side of  things.: Not quite so much now  I have looked forward with enjoyment to things.: Rather less than I used to  I have blamed myself unnecessarily when things went wrong.: Yes, some of the time  I have been anxious or worried for no good reason.: No, not at all  I have felt scared or panicky for no good reason.: No, not at all  Things have been getting on top of me.: Yes, sometimes I haven't been coping as well as usual  I have been so unhappy that I have had difficulty sleeping.: Yes, sometimes  I have felt sad or miserable.: Not very often  I have been so unhappy that I have been crying.: Yes, quite often  The thought of harming myself has occurred to me.: Never  [place]  Depression Scale Total: 11      Medication List with Changes/Refills   Current Medications    ACETAMINOPHEN (TYLENOL) 325 MG TABLET    Take 2 tablets (650 mg total) by mouth every 6 (six) hours as needed for Pain.    IBUPROFEN (ADVIL,MOTRIN) 600 MG TABLET    Take 1 tablet (600 mg total) by mouth every 6 (six) hours as needed for Pain.    PRENATAL 21-IRON FU-FOLIC ACID (PRENATAL COMPLETE) 14 MG IRON- 400 MCG TAB    Take 1 tablet by mouth once daily.     Lab Results   Component Value Date    PREGNANCYTES Negative 2024       Assessment / Plan:     Status post  routine postpartum follow-up  Vaginal irritation   Doing well, instructed patient on proper wound care, to keep the incision area dry and clean   Educated pt on signs and sx of post partum blues, post partum depression and post partum psychosis.   Plans to breast feed and bottle feed  Contraception: Injection (DepoProvera), received today, will continue with Health Unit for contraception management   Provided patient with address for Health Unit   Provided patient with social work information in addition, will reach out to our  Michelle.   Suspect dyspareunia. Consider GYN referral if symptoms persist.     Follow up:     RTC: Follow up if symptoms worsen or fail  to improve.    Plans on following up with health unit for contraception management     Tiny Rooney MD   [facility], [facility]  04/11/2024

## 2024-04-13 LAB — BACTERIA UR CULT: NO GROWTH

## 2024-09-19 ENCOUNTER — HOSPITAL ENCOUNTER (EMERGENCY)
Facility: HOSPITAL | Age: 19
Discharge: HOME | End: 2024-09-19
Attending: STUDENT IN AN ORGANIZED HEALTH CARE EDUCATION/TRAINING PROGRAM

## 2024-09-19 VITALS
RESPIRATION RATE: 18 BRPM | TEMPERATURE: 97 F | SYSTOLIC BLOOD PRESSURE: 112 MMHG | WEIGHT: 140 LBS | OXYGEN SATURATION: 99 % | HEIGHT: 59 IN | DIASTOLIC BLOOD PRESSURE: 75 MMHG | BODY MASS INDEX: 28.22 KG/M2 | HEART RATE: 88 BPM

## 2024-09-19 DIAGNOSIS — N93.8 DUB (DYSFUNCTIONAL UTERINE BLEEDING): Primary | ICD-10-CM

## 2024-09-19 LAB
ALBUMIN SERPL-MCNC: 3.9 G/DL (ref 3.5–5)
ALBUMIN/GLOB SERPL: 1.1 RATIO (ref 1.1–2)
ALP SERPL-CCNC: 101 UNIT/L (ref 40–150)
ALT SERPL-CCNC: 17 UNIT/L (ref 0–55)
ANION GAP SERPL CALC-SCNC: 7 MEQ/L
AST SERPL-CCNC: 15 UNIT/L (ref 5–34)
B-HCG UR QL: NEGATIVE
BASOPHILS # BLD AUTO: 0.07 X10(3)/MCL
BASOPHILS NFR BLD AUTO: 0.6 %
BILIRUB SERPL-MCNC: 0.8 MG/DL
BUN SERPL-MCNC: 7.8 MG/DL (ref 7–18.7)
CALCIUM SERPL-MCNC: 9.2 MG/DL (ref 8.4–10.2)
CHLORIDE SERPL-SCNC: 108 MMOL/L (ref 98–107)
CO2 SERPL-SCNC: 26 MMOL/L (ref 22–29)
CREAT SERPL-MCNC: 0.69 MG/DL (ref 0.55–1.02)
CREAT/UREA NIT SERPL: 11
CTP QC/QA: YES
EOSINOPHIL # BLD AUTO: 1.75 X10(3)/MCL (ref 0–0.9)
EOSINOPHIL NFR BLD AUTO: 15.3 %
ERYTHROCYTE [DISTWIDTH] IN BLOOD BY AUTOMATED COUNT: 13 % (ref 11.5–17)
GFR SERPLBLD CREATININE-BSD FMLA CKD-EPI: >60 ML/MIN/1.73/M2
GLOBULIN SER-MCNC: 3.7 GM/DL (ref 2.4–3.5)
GLUCOSE SERPL-MCNC: 91 MG/DL (ref 74–100)
HCT VFR BLD AUTO: 42 % (ref 37–47)
HGB BLD-MCNC: 14.5 G/DL (ref 12–16)
HOLD SPECIMEN: NORMAL
HOLD SPECIMEN: NORMAL
IMM GRANULOCYTES # BLD AUTO: 0.02 X10(3)/MCL (ref 0–0.04)
IMM GRANULOCYTES NFR BLD AUTO: 0.2 %
INR PPP: 1
LYMPHOCYTES # BLD AUTO: 3.79 X10(3)/MCL (ref 0.6–4.6)
LYMPHOCYTES NFR BLD AUTO: 33.1 %
MAGNESIUM SERPL-MCNC: 2.2 MG/DL (ref 1.6–2.6)
MCH RBC QN AUTO: 29 PG (ref 27–31)
MCHC RBC AUTO-ENTMCNC: 34.5 G/DL (ref 33–36)
MCV RBC AUTO: 84 FL (ref 80–94)
MONOCYTES # BLD AUTO: 0.44 X10(3)/MCL (ref 0.1–1.3)
MONOCYTES NFR BLD AUTO: 3.8 %
NEUTROPHILS # BLD AUTO: 5.39 X10(3)/MCL (ref 2.1–9.2)
NEUTROPHILS NFR BLD AUTO: 47 %
NRBC BLD AUTO-RTO: 0 %
PLATELET # BLD AUTO: 322 X10(3)/MCL (ref 130–400)
PMV BLD AUTO: 9.9 FL (ref 7.4–10.4)
POTASSIUM SERPL-SCNC: 3.7 MMOL/L (ref 3.5–5.1)
PROT SERPL-MCNC: 7.6 GM/DL (ref 6.4–8.3)
PROTHROMBIN TIME: 13.1 SECONDS (ref 11.4–14)
RBC # BLD AUTO: 5 X10(6)/MCL (ref 4.2–5.4)
SODIUM SERPL-SCNC: 141 MMOL/L (ref 136–145)
WBC # BLD AUTO: 11.46 X10(3)/MCL (ref 4.5–11.5)

## 2024-09-19 PROCEDURE — 81025 URINE PREGNANCY TEST: CPT | Performed by: PHYSICIAN ASSISTANT

## 2024-09-19 PROCEDURE — 85610 PROTHROMBIN TIME: CPT | Performed by: STUDENT IN AN ORGANIZED HEALTH CARE EDUCATION/TRAINING PROGRAM

## 2024-09-19 PROCEDURE — 85025 COMPLETE CBC W/AUTO DIFF WBC: CPT | Performed by: PHYSICIAN ASSISTANT

## 2024-09-19 PROCEDURE — 99284 EMERGENCY DEPT VISIT MOD MDM: CPT | Mod: 25

## 2024-09-19 PROCEDURE — 83735 ASSAY OF MAGNESIUM: CPT | Performed by: STUDENT IN AN ORGANIZED HEALTH CARE EDUCATION/TRAINING PROGRAM

## 2024-09-19 PROCEDURE — 80053 COMPREHEN METABOLIC PANEL: CPT | Performed by: PHYSICIAN ASSISTANT

## 2024-09-19 RX ORDER — IBUPROFEN 400 MG/1
400 TABLET ORAL 3 TIMES DAILY
Qty: 15 TABLET | Refills: 0 | Status: SHIPPED | OUTPATIENT
Start: 2024-09-19 | End: 2024-09-24

## 2024-09-19 NOTE — ED PROVIDER NOTES
Encounter Date: 9/19/2024       History     Chief Complaint   Patient presents with    Vaginal Bleeding     CO VAG BLEEDING X 20 DAYS W CRAMPING.       Patient presents to the emergency department due to vaginal bleeding.  She has been having daily vaginal bleeding for the last 20 days.  Intermittent pelvic cramping.  No vomiting.  No dysuria or hematuria    The history is provided by the patient. The history is limited by a language barrier. A  was used.     Review of patient's allergies indicates:  No Known Allergies  Past Medical History:   Diagnosis Date    Anemia     Mental disorder      History reviewed. No pertinent surgical history.  Family History   Problem Relation Name Age of Onset    Cancer Mother       Social History     Tobacco Use    Smoking status: Never     Passive exposure: Never    Smokeless tobacco: Never   Substance Use Topics    Alcohol use: Never    Drug use: Never     Review of Systems   Constitutional:  Negative for chills and fever.   HENT:  Negative for congestion and sore throat.    Respiratory:  Negative for cough and shortness of breath.    Cardiovascular:  Negative for chest pain and palpitations.   Gastrointestinal:  Negative for abdominal pain and nausea.   Genitourinary:  Positive for vaginal bleeding. Negative for dysuria and hematuria.   Musculoskeletal:  Negative for arthralgias and myalgias.   Neurological:  Negative for dizziness and weakness.       Physical Exam     Initial Vitals [09/19/24 1110]   BP Pulse Resp Temp SpO2   109/64 92 16 97.2 °F (36.2 °C) 97 %      MAP       --         Physical Exam    Nursing note and vitals reviewed.  Constitutional: She appears well-developed and well-nourished.   HENT:   Head: Normocephalic and atraumatic.   Eyes: EOM are normal. Pupils are equal, round, and reactive to light.   Neck: Neck supple.   Normal range of motion.  Cardiovascular:  Normal rate and regular rhythm.           Pulmonary/Chest: Breath sounds normal.  No respiratory distress.   Abdominal: Abdomen is soft. There is no abdominal tenderness.   Musculoskeletal:         General: No edema. Normal range of motion.      Cervical back: Normal range of motion and neck supple.     Neurological: She is alert and oriented to person, place, and time.   Skin: Skin is warm and dry.         ED Course   Procedures  Labs Reviewed   COMPREHENSIVE METABOLIC PANEL - Abnormal       Result Value    Sodium 141      Potassium 3.7      Chloride 108 (*)     CO2 26      Glucose 91      Blood Urea Nitrogen 7.8      Creatinine 0.69      Calcium 9.2      Protein Total 7.6      Albumin 3.9      Globulin 3.7 (*)     Albumin/Globulin Ratio 1.1      Bilirubin Total 0.8            ALT 17      AST 15      eGFR >60      Anion Gap 7.0      BUN/Creatinine Ratio 11     CBC WITH DIFFERENTIAL - Abnormal    WBC 11.46      RBC 5.00      Hgb 14.5      Hct 42.0      MCV 84.0      MCH 29.0      MCHC 34.5      RDW 13.0      Platelet 322      MPV 9.9      Neut % 47.0      Lymph % 33.1      Mono % 3.8      Eos % 15.3      Basophil % 0.6      Lymph # 3.79      Neut # 5.39      Mono # 0.44      Eos # 1.75 (*)     Baso # 0.07      IG# 0.02      IG% 0.2      NRBC% 0.0     PROTIME-INR - Normal    PT 13.1      INR 1.0     MAGNESIUM - Normal    Magnesium Level 2.20     CBC W/ AUTO DIFFERENTIAL    Narrative:     The following orders were created for panel order CBC Auto Differential.  Procedure                               Abnormality         Status                     ---------                               -----------         ------                     CBC with Differential[[id number]]       Abnormal            Final result                 Please view results for these tests on the individual orders.   EXTRA TUBES    Narrative:     The following orders were created for panel order EXTRA TUBES.  Procedure                               Abnormality         Status                     ---------                                -----------         ------                     Gold Top Hold[[id number]]                                   Final result               Pink Top Hold[[id number]]                                   Final result                 Please view results for these tests on the individual orders.   GOLD TOP HOLD    Extra Tube Hold for add-ons.     PINK TOP HOLD    Extra Tube Hold for add-ons.     POCT URINE PREGNANCY    POC Preg Test, Ur Negative       Acceptable Yes            Imaging Results              US Pelvis Comp with Transvag NON-OB (xpd (Final result)  Result time 09/19/24 12:49:43      Final result by Xiang Gallego MD (09/19/24 12:49:43)                   Narrative:    EXAMINATION  US PELVIS COMP WITH TRANSVAG NON-OB (XPD)    CLINICAL HISTORY  *Pelvic pain and bleeding;  *LMP: unknown    TECHNIQUE  Multiplanar grayscale sonographic and focused Doppler evaluation of the pelvis was performed.    COMPARISON  None available at the time of initial interpretation.    FINDINGS  Exam quality: adequate for evaluation    Uterus: Normal orientation and overall anatomic appearance.  No contour irregularity or focal lesion of the myometrium.  No focal endometrial contour irregularity or mass-like abnormality.  There is no suspicious intrauterine fluid.  No definite solid mass or expansile lesion appreciated within region of the cervix.    Adnexa: No solid adnexal mass is identified.  There is a smoothly marginated, anechoic structure measuring up to 4 cm at the right adnexa.  There is no associated internal or surrounding Doppler flow.  No suspicious left adnexal findings.  The ovaries are mildly prominent, with numerous similar sized millimetric follicles throughout.  Doppler interrogation of the ovaries demonstrates grossly maintained arterial flow, with normal low-resistance spectral waveforms. Bilateral venous outflow is also appreciated.    Other findings: Minimal volume dependent free pelvic fluid is  present, likely physiologic.    *Uterus: 6.5 cm x 3.3 cm x 5 cm  *Endometrial stripe: 0.1 cm thickness  *Right ovary: 4 cm x 1.9 cm x 2.5 cm (10 cc)  *Left ovary: 5 cm x 1.8 cm x 1.9 cm (9 cc)    IMPRESSION  1. No evidence of acute or suspicious focal pelvic abnormality.  2. Simple cystic right adnexal structure statistically represents benign etiology given overall appearance, size, and patient premenopausal age.  Due to size less than 5 cm, no specific imaging follow-up is recommended.  3. Minimal free pelvic fluid, likely physiologic.      Electronically signed by: Xiang Gallego  Date:    2024  Time:    12:49                                     Medications - No data to display  Medical Decision Making  Vital signs are stable.  Stable hemoglobin, unremarkable chemistries, pregnancy test negative.  Ultrasound shows evidence of a cystic lesion but otherwise no acute abnormalities.  Will place Motrin and refer to Gynecology.    Amount and/or Complexity of Data Reviewed  Labs: ordered. Decision-making details documented in ED Course.  Radiology: ordered. Decision-making details documented in ED Course.    Risk  OTC drugs.  Prescription drug management.                                      Clinical Impression:  Final diagnoses:  [N93.8] DUB (dysfunctional uterine bleeding) (Primary)          ED Disposition Condition    Discharge Stable          ED Prescriptions       Medication Sig Dispense Start Date End Date Auth. Provider    ibuprofen (ADVIL,MOTRIN) 400 MG tablet () Take 1 tablet (400 mg total) by mouth 3 (three) times daily. for 5 days 15 tablet 2024 Kevin Levi MD          Follow-up Information       Follow up With Specialties Details Why Contact Info    [facility] - Emergency Dept Emergency Medicine Go to  If symptoms worsen [address] [facility] [id number] [phone]             Kevin Levi MD  10/16/24 9389

## 2025-01-27 PROBLEM — Z3A.39 39 WEEKS GESTATION OF PREGNANCY: Status: RESOLVED | Noted: 2024-02-15 | Resolved: 2025-01-27

## 2025-06-25 ENCOUNTER — OFFICE VISIT (OUTPATIENT)
Dept: GYNECOLOGY | Facility: CLINIC | Age: 20
End: 2025-06-25

## 2025-06-25 VITALS
SYSTOLIC BLOOD PRESSURE: 103 MMHG | TEMPERATURE: 98 F | HEART RATE: 75 BPM | OXYGEN SATURATION: 99 % | BODY MASS INDEX: 27.82 KG/M2 | HEIGHT: 59 IN | WEIGHT: 138 LBS | DIASTOLIC BLOOD PRESSURE: 67 MMHG

## 2025-06-25 DIAGNOSIS — Z86.19 HISTORY OF CHLAMYDIA: Primary | ICD-10-CM

## 2025-06-25 DIAGNOSIS — N93.8 DUB (DYSFUNCTIONAL UTERINE BLEEDING): ICD-10-CM

## 2025-06-25 LAB
C TRACH DNA SPEC QL NAA+PROBE: NOT DETECTED
CLUE CELLS VAG QL WET PREP: ABNORMAL
N GONORRHOEA DNA SPEC QL NAA+PROBE: NOT DETECTED
SPECIMEN SOURCE: NORMAL
T VAGINALIS VAG QL WET PREP: ABNORMAL
WBC #/AREA VAG WET PREP: ABNORMAL
YEAST SPEC QL WET PREP: ABNORMAL

## 2025-06-25 PROCEDURE — 87591 N.GONORRHOEAE DNA AMP PROB: CPT

## 2025-06-25 PROCEDURE — 99213 OFFICE O/P EST LOW 20 MIN: CPT | Mod: PBBFAC

## 2025-06-25 PROCEDURE — 87210 SMEAR WET MOUNT SALINE/INK: CPT

## 2025-06-25 RX ORDER — NORGESTIMATE AND ETHINYL ESTRADIOL 0.25-0.035
1 KIT ORAL DAILY
Qty: 30 TABLET | Refills: 11 | Status: SHIPPED | OUTPATIENT
Start: 2025-06-25 | End: 2026-06-25

## 2025-06-25 NOTE — PROGRESS NOTES
"[facility] NOTE  [facility]  [address] [place] [address]  Phone: [phone]  Fax: [phone]    Subjective:     Inocencia Tucker is a 19 y.o.  who presents complaining of right adnexal cyst and abnormal bleeding. Pelvic pain has improved. Onset about 16 months. Prolonged bleeding lasting up to 15 days. Patient is on oral contraceptives, states bleeding is better when on it but when she forgets she gets prolonged bleeding. She has been on oral contraceptives for about 3 months. Patient receives her OCP from a local [employer] [facility] because she could not get a prescription.    Allergies:   OBHx:  GynHx:   Menarche @ 12, irregular every 3-4 months, up to 15 day cycles, 3-4 pads/day  LMP: 6/10/25  Hx of STIs and abnormal paps: chlamydia, Pap  NILM, HPV + other  Contraception: OCP.    MedHx:   Past Medical History:   Diagnosis Date    Anemia     Mental disorder        SurgHx:   History reviewed. No pertinent surgical history.    Medications:   Current Medications[1]    FM Hx: Denies hx of ovarian, uterine, endometrial, or colon cancer.  Social Hx: Denies tobacco, alcohol and illicit drug usage.    Review of Systems  Denies fevers, chills, headache, blurry vision, nausea, vomiting, dizziness, or syncope.  Denies chest pain, shortness of breath, RUQ pain, or calf pain.    Objective:     Vitals:    25 1044   BP: 103/67   Pulse: 75   Temp: 98.4 °F (36.9 °C)   SpO2: 99%   Weight: 62.6 kg (138 lb)   Height: 4' 11" (1.499 m)     Body mass index is 27.87 kg/m².    Physical Exam:     General: alert and oriented, in no acute distress  Lungs: clear to auscultation bilaterally, no conversational dyspnea  Heart: regular rate and rhythm, S1, S2 normal, no murmur  Abdomen: Soft, non-distended, non tender to palpation, no involuntary guarding, no rebound tenderness normoactive bowel sounds  Extremities: Normal, atraumatic, non-edematous, No cords or calf tenderness, No significant calf/ankle edema  External " genitalia: Normal female genitalia without lesion, discharge or tenderness. Normal appearing urethral meatus. Normal appearing external anus.  Bimanual Exam: No pelvic lymphadenopathy noted bilaterally. Vagina with adequate capacity. Uterus 8cm in size, no cervical motion tenderness. Smooth in contour, no masses. Good descent, mobile. No adnexal fullness/tenderness. Normal urethra. Normal bladder.  Speculum Exam: Vaginal mucosa normal in appearance. Pink. No masses/lesions. Cervix well visualized, smooth in contour no masses or lesions. Os normal in appearance, no blood or discharge coming from the os    Note: RN chaperone present for entirety of exam.    Labs  No results found for this or any previous visit (from the past 24 hours).    Imaging  No images are attached to the encounter.    Assessment:   19 y.o.  with abnormal uterine bleeding consistent with AUB. Her pelvic cramps are resolved    Plan:     AUB  R cystic adnexal structure  - pain controlled  - discussed LARC today; patient is self pay and cost is too high  - patient is agreeable to prescription Sprintec; advised her to stop taking the OTC OCP she's been receiving  - wet prep and GC-chlamydia ordered today  - continue f/u with NP    Patient and plan were discussed with Dr. Norton.    RTC in 1 year with NP    Christian Morales MD  Family Medicine, [facility]              [1]   Current Outpatient Medications:     acetaminophen (TYLENOL) 325 MG tablet, Take 2 tablets (650 mg total) by mouth every 6 (six) hours as needed for Pain. (Patient not taking: Reported on 2025), Disp: 30 tablet, Rfl: 0    prenatal 21-iron fu-folic acid (PRENATAL COMPLETE) 14 mg iron- 400 mcg Tab, Take 1 tablet by mouth once daily. (Patient not taking: Reported on 2025), Disp: 90 tablet, Rfl: 6